# Patient Record
Sex: FEMALE | Race: WHITE | NOT HISPANIC OR LATINO | Employment: UNEMPLOYED | RURAL
[De-identification: names, ages, dates, MRNs, and addresses within clinical notes are randomized per-mention and may not be internally consistent; named-entity substitution may affect disease eponyms.]

---

## 2018-06-28 ENCOUNTER — OFFICE VISIT (OUTPATIENT)
Dept: FAMILY MEDICINE CLINIC | Facility: CLINIC | Age: 28
End: 2018-06-28

## 2018-06-28 VITALS
WEIGHT: 107 LBS | OXYGEN SATURATION: 98 % | RESPIRATION RATE: 20 BRPM | SYSTOLIC BLOOD PRESSURE: 115 MMHG | DIASTOLIC BLOOD PRESSURE: 80 MMHG | HEIGHT: 56 IN | HEART RATE: 101 BPM | TEMPERATURE: 99.2 F | BODY MASS INDEX: 24.07 KG/M2
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
## 2018-06-28 PROCEDURE — 99213 OFFICE O/P EST LOW 20 MIN: CPT | Performed by: NURSE PRACTITIONER

## 2018-06-28 RX ORDER — NORGESTREL-ETHINYL ESTRADIOL 0.3-0.03MG
TABLET ORAL
COMMUNITY
Start: 2018-06-06 | End: 2019-09-09

## 2018-06-28 RX ORDER — METHYLPREDNISOLONE 4 MG/1
TABLET ORAL
Qty: 21 EACH | Refills: 0 | Status: SHIPPED | OUTPATIENT
Start: 2018-06-28 | End: 2019-09-09

## 2018-06-28 NOTE — PROGRESS NOTES
Subjective   Paola Rivera is a 27 y.o. female.     Here today with complaints of an insect bite on her right upper thigh.  First noted yesterday.  Is very itchy and has used no meds on the site.      Insect Bite   This is a new problem. The current episode started yesterday. The problem occurs constantly. The problem has been unchanged. Associated symptoms include a rash. Pertinent negatives include no abdominal pain, anorexia, arthralgias, change in bowel habit, chest pain, chills, congestion, coughing, diaphoresis, fatigue, fever, headaches, joint swelling, myalgias, nausea, neck pain, numbness, sore throat, swollen glands, urinary symptoms, vertigo, visual change, vomiting or weakness. Nothing aggravates the symptoms. She has tried nothing for the symptoms. The treatment provided no relief.        The following portions of the patient's history were reviewed and updated as appropriate: allergies, current medications, past family history, past medical history, past social history, past surgical history and problem list.    Review of Systems   Constitutional: Negative.  Negative for chills, diaphoresis, fatigue and fever.   HENT: Negative.  Negative for congestion and sore throat.    Respiratory: Negative.  Negative for cough.    Cardiovascular: Negative.  Negative for chest pain.   Gastrointestinal: Negative for abdominal pain, anorexia, change in bowel habit, nausea and vomiting.   Musculoskeletal: Negative.  Negative for arthralgias, joint swelling, myalgias and neck pain.   Skin: Positive for rash.   Neurological: Negative.  Negative for vertigo, weakness and numbness.   Psychiatric/Behavioral: Negative.        Objective   Physical Exam   Constitutional: She is oriented to person, place, and time. She appears well-developed and well-nourished. No distress.   HENT:   Head: Normocephalic.   Eyes: Pupils are equal, round, and reactive to light.   Neck: Normal range of motion. Neck supple. No thyromegaly  present.   Cardiovascular: Normal rate, regular rhythm and normal heart sounds.  Exam reveals no friction rub.    No murmur heard.  Pulmonary/Chest: Effort normal and breath sounds normal. No respiratory distress. She has no wheezes. She has no rales.   Abdominal: Soft.   Musculoskeletal: Normal range of motion.   Neurological: She is alert and oriented to person, place, and time.   Skin: Skin is warm and dry.   Has a central pinpoint red spot that may have been the actual bite.  Also has some red raised areas around the bite.  There are no pustules or vesicles.  There area no areas of drainage or weeping.  No indication at this time of infection.   Psychiatric: She has a normal mood and affect. Thought content normal.   Nursing note and vitals reviewed.        Assessment/Plan   [place] was seen today for annual exam.    Diagnoses and all orders for this visit:    Insect bite, initial encounter  -     triamcinolone (KENALOG) 0.1 % ointment; Apply  topically 3 (Three) Times a Day.  -     MethylPREDNISolone (MEDROL, REYNALDO,) 4 MG tablet; Take as directed on package instructions.

## 2019-09-09 ENCOUNTER — OFFICE VISIT (OUTPATIENT)
Dept: FAMILY MEDICINE CLINIC | Facility: CLINIC | Age: 29
End: 2019-09-09

## 2019-09-09 VITALS
DIASTOLIC BLOOD PRESSURE: 70 MMHG | RESPIRATION RATE: 20 BRPM | BODY MASS INDEX: 24.86 KG/M2 | SYSTOLIC BLOOD PRESSURE: 110 MMHG | TEMPERATURE: 98 F | HEIGHT: 56 IN | OXYGEN SATURATION: 100 % | WEIGHT: 110.5 LBS | HEART RATE: 108 BPM

## 2019-09-09 DIAGNOSIS — N92.6 MISSED PERIOD: ICD-10-CM

## 2019-09-09 DIAGNOSIS — R11.0 NAUSEA: ICD-10-CM

## 2019-09-09 DIAGNOSIS — Z32.01 POSITIVE PREGNANCY TEST: Primary | ICD-10-CM

## 2019-09-09 LAB
B-HCG UR QL: POSITIVE
BILIRUB BLD-MCNC: ABNORMAL MG/DL
CLARITY, POC: ABNORMAL
COLOR UR: ABNORMAL
GLUCOSE UR STRIP-MCNC: NEGATIVE MG/DL
INTERNAL NEGATIVE CONTROL: NEGATIVE
INTERNAL POSITIVE CONTROL: POSITIVE
KETONES UR QL: ABNORMAL
LEUKOCYTE EST, POC: ABNORMAL
Lab: ABNORMAL
NITRITE UR-MCNC: NEGATIVE MG/ML
PH UR: 6 [PH] (ref 5–8)
PROT UR STRIP-MCNC: ABNORMAL MG/DL
RBC # UR STRIP: NEGATIVE /UL
SP GR UR: 1.03 (ref 1–1.03)
UROBILINOGEN UR QL: ABNORMAL

## 2019-09-09 PROCEDURE — 99213 OFFICE O/P EST LOW 20 MIN: CPT | Performed by: NURSE PRACTITIONER

## 2019-09-09 PROCEDURE — 81025 URINE PREGNANCY TEST: CPT | Performed by: NURSE PRACTITIONER

## 2019-09-09 PROCEDURE — 87086 URINE CULTURE/COLONY COUNT: CPT | Performed by: NURSE PRACTITIONER

## 2019-09-09 RX ORDER — DIPHENHYDRAMINE HYDROCHLORIDE 25 MG/1
25 CAPSULE ORAL EVERY 8 HOURS PRN
Qty: 30 TABLET | Refills: 1 | Status: SHIPPED | OUTPATIENT
Start: 2019-09-09 | End: 2021-06-07

## 2019-09-09 RX ORDER — FOLIC ACID, .BETA.-CAROTENE, ASCORBIC ACID, CHOLECALCIFEROL, .ALPHA.-TOCOPHEROL ACETATE, DL-, THIAMINE MONONITRATE, RIBOFLAVIN, NIACINAMIDE, PYRIDOXINE HYDROCHLORIDE, CYANOCOBALAMIN, CALCIUM PANTOTHENATE, CALCIUM CARBONATE, FERROUS FUMARATE, AND ZINC OXIDE 1; 1000; 100; 400; 30; 3; 3; 15; 20; 12; 7; 200; 29; 20 MG/1; [IU]/1; MG/1; [IU]/1; [IU]/1; MG/1; MG/1; MG/1; MG/1; UG/1; MG/1; MG/1; MG/1; MG/1
1 TABLET, CHEWABLE ORAL DAILY
Qty: 30 TABLET | Refills: 8 | Status: SHIPPED | OUTPATIENT
Start: 2019-09-09 | End: 2021-06-07

## 2019-09-09 NOTE — PROGRESS NOTES
"Subjective   Paola Rivera is a 29 y.o. female.     [facility] Visit    PCP: CATHERINE Lopez    CC: \"throwing up, smell of different stuff\"    Stopped taking OCP in early August due to nausea/vomiting.  N/V has been intermittent since that time.  Has had mild low back pain and occasional mild abdominal cramping.  Denies dysuria or vaginal discharge or bleeding.       Vomiting    This is a new problem. The current episode started more than 1 month ago. The problem occurs intermittently. The problem has been waxing and waning. There has been no fever. Pertinent negatives include no abdominal pain, arthralgias, chest pain, chills, coughing, diarrhea, dizziness, fever, headaches, myalgias, sweats, URI or weight loss. She has tried nothing for the symptoms.        The following portions of the patient's history were reviewed and updated as appropriate: allergies, current medications, past medical history, past social history, past surgical history and problem list.    Review of Systems   Constitutional: Positive for fatigue. Negative for appetite change, chills, fever and unexpected weight loss.   Respiratory: Negative.  Negative for cough.    Cardiovascular: Negative.  Negative for chest pain.   Gastrointestinal: Positive for nausea and vomiting. Negative for abdominal pain and diarrhea.   Genitourinary: Positive for menstrual problem. Negative for breast discharge, breast lump, breast pain and difficulty urinating.   Musculoskeletal: Negative for arthralgias and myalgias.   Skin: Negative for rash.   Neurological: Negative for dizziness and headache.     /70 (BP Location: Right arm, Patient Position: Sitting, Cuff Size: Adult)   Pulse 108   Temp 98 °F (36.7 °C) (Oral)   Resp 20   Ht 142.2 cm (56\")   Wt 50.1 kg (110 lb 8 oz)   LMP  (LMP Unknown)   SpO2 100%   Breastfeeding? No   BMI 24.77 kg/m²     Objective   Physical Exam   Constitutional: She is oriented to person, place, and time. She appears " well-developed and well-nourished. No distress.   Cardiovascular: Normal rate and regular rhythm.   Pulmonary/Chest: Effort normal and breath sounds normal. She has no wheezes. She has no rales.   Abdominal: Soft. Bowel sounds are normal. There is no tenderness. There is no rebound and no guarding.   Genitourinary:   Genitourinary Comments: No flank pain     Neurological: She is alert and oriented to person, place, and time.   Nursing note and vitals reviewed.    Recent Results (from the past 24 hour(s))   POCT pregnancy, urine    Collection Time: 09/09/19  4:45 PM   Result Value Ref Range    HCG, Urine, QL Positive (A) Negative    Lot Number [id number]     Internal Positive Control Positive     Internal Negative Control Negative    POCT urinalysis dipstick, automated    Collection Time: 09/09/19  4:46 PM   Result Value Ref Range    Color Orange (A) Yellow, Straw, Dark Yellow, Cherie    Clarity, UA Cloudy (A) Clear    Specific Gravity  1.030 1.005 - 1.030    pH, Urine 6.0 5.0 - 8.0    Leukocytes Trace (A) Negative    Nitrite, UA Negative Negative    Protein, POC 30 mg/dL (A) Negative mg/dL    Glucose, UA Negative Negative, 1000 mg/dL (3+) mg/dL    Ketones, UA Trace (A) Negative    Urobilinogen, UA 1 E.U./dL  (A) Normal    Bilirubin Small (1+) (A) Negative    Blood, UA Negative Negative     No Images in the past 120 days found..      Assessment/Plan   Paola was seen today for vomiting.    Diagnoses and all orders for this visit:    Positive pregnancy test  -     Ambulatory Referral to Obstetrics / Gynecology  -     Prenatal Vit-Fe Fumarate-FA (PRENATAL 19) 29-1 MG chewable tablet; Chew 1 tablet Daily.  -     vitamin B-6 (PYRIDOXINE) 25 MG tablet; Take 1 tablet by mouth Every 8 (Eight) Hours As Needed (nausea).    Nausea  -     POCT pregnancy, urine  -     POCT urinalysis dipstick, automated  -     Urine Culture - Urine, Urine, Clean Catch    Missed period  -     POCT pregnancy, urine      Information on First  Trimester provided  Rx for Prenatal Vitamins provided  Rx for B6 to help with nausea  Frequent small meals encouraged  Drink 6-8 glasses of water per day  Referral to OB placed--wishes to go to [facility]  Urine culture pending--will call if + and treat accordingly

## 2019-09-09 NOTE — PATIENT INSTRUCTIONS
First Trimester of Pregnancy    The first trimester of pregnancy is from week 1 until the end of week 13 (months 1 through 3). During this time, your baby will begin to develop inside you. At 6-8 weeks, the eyes and face are formed, and the heartbeat can be seen on ultrasound. At the end of 12 weeks, all the baby's organs are formed. Prenatal care is all the medical care you receive before the birth of your baby. Make sure you get good prenatal care and follow all of your doctor's instructions.  Follow these instructions at home:  Medicines  · Take over-the-counter and prescription medicines only as told by your doctor. Some medicines are safe and some medicines are not safe during pregnancy.  · Take a prenatal vitamin that contains at least 600 micrograms (mcg) of folic acid.  · If you have trouble pooping (constipation), take medicine that will make your stool soft (stool softener) if your doctor approves.  Eating and drinking    · Eat regular, healthy meals.  · Your doctor will tell you the amount of weight gain that is right for you.  · Avoid raw meat and uncooked cheese.  · If you feel sick to your stomach (nauseous) or throw up (vomit):  ? Eat 4 or 5 small meals a day instead of 3 large meals.  ? Try eating a few soda crackers.  ? Drink liquids between meals instead of during meals.  · To prevent constipation:  ? Eat foods that are high in fiber, like fresh fruits and vegetables, whole grains, and beans.  ? Drink enough fluids to keep your pee (urine) clear or pale yellow.  Activity  · Exercise only as told by your doctor. Stop exercising if you have cramps or pain in your lower belly (abdomen) or low back.  · Do not exercise if it is too hot, too humid, or if you are in a place of great height (high altitude).  · Try to avoid standing for long periods of time. Move your legs often if you must  one place for a long time.  · Avoid heavy lifting.  · Wear low-heeled shoes. Sit and stand up  straight.  · You can have sex unless your doctor tells you not to.  Relieving pain and discomfort  · Wear a good support bra if your breasts are sore.  · Take warm water baths (sitz baths) to soothe pain or discomfort caused by hemorrhoids. Use hemorrhoid cream if your doctor says it is okay.  · Rest with your legs raised if you have leg cramps or low back pain.  · If you have puffy, bulging veins (varicose veins) in your legs:  ? Wear support hose or compression stockings as told by your doctor.  ? Raise (elevate) your feet for 15 minutes, 3-4 times a day.  ? Limit salt in your food.  Prenatal care  · Schedule your prenatal visits by the twelfth week of pregnancy.  · Write down your questions. Take them to your prenatal visits.  · Keep all your prenatal visits as told by your doctor. This is important.  Safety  · Wear your seat belt at all times when driving.  · Make a list of emergency phone numbers. The list should include numbers for family, friends, the hospital, and police and fire departments.  General instructions  · Ask your doctor for a referral to a local prenatal class. Begin classes no later than at the start of month 6 of your pregnancy.  · Ask for help if you need counseling or if you need help with nutrition. Your doctor can give you advice or tell you where to go for help.  · Do not use hot tubs, steam rooms, or saunas.  · Do not douche or use tampons or scented sanitary pads.  · Do not cross your legs for long periods of time.  · Avoid all herbs and alcohol. Avoid drugs that are not approved by your doctor.  · Do not use any tobacco products, including cigarettes, chewing tobacco, and electronic cigarettes. If you need help quitting, ask your doctor. You may get counseling or other support to help you quit.  · Avoid cat litter boxes and soil used by cats. These carry germs that can cause birth defects in the baby and can cause a loss of your baby (miscarriage) or stillbirth.  · Visit your dentist.  At home, brush your teeth with a soft toothbrush. Be gentle when you floss.  Contact a doctor if:  · You are dizzy.  · You have mild cramps or pressure in your lower belly.  · You have a nagging pain in your belly area.  · You continue to feel sick to your stomach, you throw up, or you have watery poop (diarrhea).  · You have a bad smelling fluid coming from your vagina.  · You have pain when you pee (urinate).  · You have increased puffiness (swelling) in your face, hands, legs, or ankles.  Get help right away if:  · You have a fever.  · You are leaking fluid from your vagina.  · You have spotting or bleeding from your vagina.  · You have very bad belly cramping or pain.  · You gain or lose weight rapidly.  · You throw up blood. It may look like coffee grounds.  · You are around people who have [place] measles, fifth disease, or chickenpox.  · You have a very bad headache.  · You have shortness of breath.  · You have any kind of trauma, such as from a fall or a car accident.  Summary  · The first trimester of pregnancy is from week 1 until the end of week 13 (months 1 through 3).  · To take care of yourself and your unborn baby, you will need to eat healthy meals, take medicines only if your doctor tells you to do so, and do activities that are safe for you and your baby.  · Keep all follow-up visits as told by your doctor. This is important as your doctor will have to ensure that your baby is healthy and growing well.  This information is not intended to replace advice given to you by your health care provider. Make sure you discuss any questions you have with your health care provider.  Document Released: 06/05/2009 Document Revised: 12/26/2017 Document Reviewed: 12/26/2017  [employer] Interactive Patient Education © 2019 [employer]

## 2019-09-11 LAB — BACTERIA SPEC AEROBE CULT: NO GROWTH

## 2021-06-04 ENCOUNTER — TELEPHONE (OUTPATIENT)
Dept: FAMILY MEDICINE CLINIC | Facility: CLINIC | Age: 31
End: 2021-06-04

## 2021-06-07 ENCOUNTER — OFFICE VISIT (OUTPATIENT)
Dept: FAMILY MEDICINE CLINIC | Facility: CLINIC | Age: 31
End: 2021-06-07

## 2021-06-07 VITALS
TEMPERATURE: 98.2 F | WEIGHT: 106 LBS | BODY MASS INDEX: 23.84 KG/M2 | DIASTOLIC BLOOD PRESSURE: 70 MMHG | HEART RATE: 85 BPM | OXYGEN SATURATION: 95 % | SYSTOLIC BLOOD PRESSURE: 112 MMHG | RESPIRATION RATE: 20 BRPM | HEIGHT: 56 IN

## 2021-06-07 DIAGNOSIS — E55.9 VITAMIN D DEFICIENCY: ICD-10-CM

## 2021-06-07 DIAGNOSIS — Z13.6 SCREENING FOR CARDIOVASCULAR CONDITION: ICD-10-CM

## 2021-06-07 DIAGNOSIS — Z13.29 SCREENING FOR THYROID DISORDER: ICD-10-CM

## 2021-06-07 DIAGNOSIS — Z76.89 ENCOUNTER TO ESTABLISH CARE: ICD-10-CM

## 2021-06-07 DIAGNOSIS — Z13.220 SCREENING, LIPID: ICD-10-CM

## 2021-06-07 DIAGNOSIS — Z11.59 ENCOUNTER FOR HEPATITIS C SCREENING TEST FOR LOW RISK PATIENT: ICD-10-CM

## 2021-06-07 DIAGNOSIS — Z00.00 ROUTINE GENERAL MEDICAL EXAMINATION AT A HEALTH CARE FACILITY: Primary | ICD-10-CM

## 2021-06-07 DIAGNOSIS — Z13.1 SCREENING FOR DIABETES MELLITUS: ICD-10-CM

## 2021-06-07 PROBLEM — F41.9 ANXIETY: Status: RESOLVED | Noted: 2020-12-15 | Resolved: 2021-06-07

## 2021-06-07 PROBLEM — F41.9 ANXIETY: Status: ACTIVE | Noted: 2020-12-15

## 2021-06-07 PROBLEM — Z32.01 POSITIVE PREGNANCY TEST: Status: RESOLVED | Noted: 2019-09-09 | Resolved: 2021-06-07

## 2021-06-07 PROCEDURE — 99395 PREV VISIT EST AGE 18-39: CPT | Performed by: NURSE PRACTITIONER

## 2021-06-07 RX ORDER — NORGESTREL-ETHINYL ESTRADIOL 0.3-0.03MG
1 TABLET ORAL DAILY
COMMUNITY

## 2021-06-07 NOTE — PROGRESS NOTES
Chief Complaint  Establish Care (New Pt)    Subjective    History of Present Illness {CC  Problem List  Visit  Diagnosis   Encounters  Notes  Medications  Labs  Result Review Imaging  Media :23}     Paola Rivera presents to [facility] for   Est care with no c/o or concerns today. Reports hx of anxiety/panic episodes and trouble swallong and went to ER a few times but once she had her gallbladder removed she states no longer having these issues. Reports having EGD Jan 2021 that showed GERD and small hiatal hernia - had gall bladder removed approx 3-16/2021. Is unable to take reflux medication d/t unable to swallow pills. Reports sees provider at [facility] for PAP and OCP refills       Objective     Physical Exam  Vitals and nursing note reviewed.   Constitutional:       General: She is not in acute distress.     Appearance: Normal appearance. She is normal weight.      Comments: SO present at todays exam   HENT:      Head: Normocephalic and atraumatic.      Right Ear: Ear canal and external ear normal. A middle ear effusion (clear) is present.      Left Ear: Ear canal and external ear normal. A middle ear effusion (clear) is present.      Nose: Nose normal. No congestion or rhinorrhea.      Mouth/Throat:      Mouth: Mucous membranes are moist.      Pharynx: Oropharynx is clear.   Eyes:      Extraocular Movements: Extraocular movements intact.      Conjunctiva/sclera: Conjunctivae normal.      Pupils: Pupils are equal, round, and reactive to light.   Neck:      Vascular: No carotid bruit.   Cardiovascular:      Rate and Rhythm: Normal rate and regular rhythm.      Pulses: Normal pulses.      Heart sounds: Normal heart sounds. No murmur heard.     Pulmonary:      Effort: Pulmonary effort is normal.      Breath sounds: Normal breath sounds. No wheezing or rhonchi.   Abdominal:      General: Abdomen is flat. Bowel sounds are normal. There is no distension.      Palpations: Abdomen  "is soft.      Tenderness: There is no abdominal tenderness. There is no right CVA tenderness or left CVA tenderness.   Musculoskeletal:         General: No swelling, tenderness, deformity or signs of injury. Normal range of motion.      Cervical back: Normal range of motion and neck supple. No muscular tenderness.      Right lower leg: No edema.      Left lower leg: No edema.   Lymphadenopathy:      Cervical: No cervical adenopathy.   Skin:     General: Skin is warm and dry.      Capillary Refill: Capillary refill takes less than 2 seconds.      Coloration: Skin is not pale.      Findings: No erythema.   Neurological:      General: No focal deficit present.      Mental Status: She is alert and oriented to person, place, and time.   Psychiatric:         Mood and Affect: Mood normal.         Behavior: Behavior normal.        Result Review  Data Reviewed:{ Labs  Result Review  Imaging  Med Tab  Media :23}          Vital Signs:   /70 (BP Location: Right arm, Patient Position: Sitting, Cuff Size: Adult)   Pulse 85   Temp 98.2 °F (36.8 °C) (Temporal)   Resp 20   Ht 142.2 cm (56\")   Wt 48.1 kg (106 lb)   SpO2 95%   BMI 23.76 kg/m²          Assessment and Plan {CC Problem List  Visit Diagnosis  ROS  Review (Popup)  Health Maintenance  Quality  BestPractice  Medications  SmartSets  SnapShot Encounters  Media :23}   Problem List Items Addressed This Visit     None      Visit Diagnoses     Routine general medical examination at a health care facility    -  Primary    Encounter to establish care        Vitamin D deficiency        Relevant Orders    Vitamin D 25 hydroxy    Screening for cardiovascular condition        Relevant Orders    CBC No Differential    Comprehensive metabolic panel    Screening, lipid        Relevant Orders    Lipid panel    Screening for diabetes mellitus        Relevant Orders    Hemoglobin A1c    Screening for thyroid disorder        Relevant Orders    T4, free    TSH    " Encounter for hepatitis C screening test for low risk patient        Relevant Orders    Hepatitis C antibody         Diagnosis Plan   1. Routine general medical examination at a health care facility     2. Encounter to establish care     3. Vitamin D deficiency  Vitamin D 25 hydroxy   4. Screening for cardiovascular condition  CBC No Differential    Comprehensive metabolic panel   5. Screening, lipid  Lipid panel   6. Screening for diabetes mellitus  Hemoglobin A1c   7. Screening for thyroid disorder  T4, free    TSH   8. Encounter for hepatitis C screening test for low risk patient  Hepatitis C antibody     - Will call with lab results  - RTC yearly or PRN    Follow Up {Instructions Charge Capture  Follow-up Communications :23}   Return if symptoms worsen or fail to improve.  Patient was given instructions and counseling regarding her condition or for health maintenance advice. Please see specific information pulled into the AVS if appropriate            .  This document has been electronically signed by CATHERINE Gorman on June 7, 2021 16:45 CDT

## 2021-06-07 NOTE — PATIENT INSTRUCTIONS
Preventive Care 21-39 Years Old, Female  Preventive care refers to lifestyle choices and visits with your health care provider that can promote health and wellness. This includes:  · A yearly physical exam. This is also called an annual wellness visit.  · Regular dental and eye exams.  · Immunizations.  · Screening for certain conditions.  · Healthy lifestyle choices, such as:  ? Eating a healthy diet.  ? Getting regular exercise.  ? Not using drugs or products that contain nicotine and tobacco.  ? Limiting alcohol use.  What can I expect for my preventive care visit?  Physical exam  Your health care provider may check your:  · Height and weight. These may be used to calculate your BMI (body mass index). BMI is a measurement that tells if you are at a healthy weight.  · Heart rate and blood pressure.  · Body temperature.  · Skin for abnormal spots.  Counseling  Your health care provider may ask you questions about your:  · Past medical problems.  · Family's medical history.  · Alcohol, tobacco, and drug use.  · Emotional well-being.  · Home life and relationship well-being.  · Sexual activity.  · Diet, exercise, and sleep habits.  · Work and work environment.  · Access to firearms.  · Method of birth control.  · Menstrual cycle.  · Pregnancy history.  What immunizations do I need?    Vaccines are usually given at various ages, according to a schedule. Your health care provider will recommend vaccines for you based on your age, medical history, and lifestyle or other factors, such as travel or where you work.  What tests do I need?    Blood tests  · Lipid and cholesterol levels. These may be checked every 5 years starting at age 20.  · Hepatitis C test.  · Hepatitis B test.  Screening  · Diabetes screening. This is done by checking your blood sugar (glucose) after you have not eaten for a while (fasting).  · STD (sexually transmitted disease) testing, if you are at risk.  · BRCA-related cancer screening. This may be  done if you have a family history of breast, ovarian, tubal, or peritoneal cancers.  · Pelvic exam and Pap test. This may be done every 3 years starting at age 21. Starting at age 30, this may be done every 5 years if you have a Pap test in combination with an HPV test.  Talk with your health care provider about your test results, treatment options, and if necessary, the need for more tests.  Follow these instructions at home:  Eating and drinking    · Eat a healthy diet that includes fresh fruits and vegetables, whole grains, lean protein, and low-fat dairy products.  · Take vitamin and mineral supplements as recommended by your health care provider.  · Do not drink alcohol if:  ? Your health care provider tells you not to drink.  ? You are pregnant, may be pregnant, or are planning to become pregnant.  · If you drink alcohol:  ? Limit how much you have to 0-1 drink a day.  ? Be aware of how much alcohol is in your drink. In the [place] one drink equals one 12 oz bottle of beer (355 mL), one 5 oz glass of wine (148 mL), or one 1½ oz glass of hard liquor (44 mL).  Lifestyle  · Take daily care of your teeth and gums. Brush your teeth every morning and night with fluoride toothpaste. Floss one time each day.  · Stay active. Exercise for at least 30 minutes 5 or more days each week.  · Do not use any products that contain nicotine or tobacco, such as cigarettes, e-cigarettes, and chewing tobacco. If you need help quitting, ask your health care provider.  · Do not use drugs.  · If you are sexually active, practice safe sex. Use a condom or other form of birth control (contraception) in order to prevent pregnancy and STIs (sexually transmitted infections). If you plan to become pregnant, see your health care provider for a pre-conception visit.  · Find healthy ways to cope with stress, such as:  ? Meditation, yoga, or listening to music.  ? Journaling.  ? Talking to a trusted person.  ? Spending time with friends and  family.  Safety  · Always wear your seat belt while driving or riding in a vehicle.  · Do not drive:  ? If you have been drinking alcohol. Do not ride with someone who has been drinking.  ? When you are tired or distracted.  ? While texting.  · Wear a helmet and other protective equipment during sports activities.  · If you have firearms in your house, make sure you follow all gun safety procedures.  · Seek help if you have been physically or sexually abused.  What's next?  · Go to your health care provider once a year for an annual wellness visit.  · Ask your health care provider how often you should have your eyes and teeth checked.  · Stay up to date on all vaccines.  This information is not intended to replace advice given to you by your health care provider. Make sure you discuss any questions you have with your health care provider.  Document Revised: 09/02/2020 Document Reviewed: 08/29/2019  [employer] Patient Education © 2021 [employer]

## 2021-06-09 ENCOUNTER — LAB (OUTPATIENT)
Dept: LAB | Facility: HOSPITAL | Age: 31
End: 2021-06-09

## 2021-06-09 DIAGNOSIS — E55.9 VITAMIN D DEFICIENCY: ICD-10-CM

## 2021-06-09 DIAGNOSIS — Z11.59 ENCOUNTER FOR HEPATITIS C SCREENING TEST FOR LOW RISK PATIENT: ICD-10-CM

## 2021-06-09 DIAGNOSIS — Z13.220 SCREENING, LIPID: ICD-10-CM

## 2021-06-09 DIAGNOSIS — Z13.6 SCREENING FOR CARDIOVASCULAR CONDITION: ICD-10-CM

## 2021-06-09 DIAGNOSIS — Z13.29 SCREENING FOR THYROID DISORDER: ICD-10-CM

## 2021-06-09 DIAGNOSIS — Z13.1 SCREENING FOR DIABETES MELLITUS: ICD-10-CM

## 2021-06-09 LAB
25(OH)D3 SERPL-MCNC: 18.3 NG/ML (ref 30–100)
ALBUMIN SERPL-MCNC: 4.1 G/DL (ref 3.5–5.2)
ALBUMIN/GLOB SERPL: 1.5 G/DL
ALP SERPL-CCNC: 110 U/L (ref 39–117)
ALT SERPL W P-5'-P-CCNC: 12 U/L (ref 1–33)
ANION GAP SERPL CALCULATED.3IONS-SCNC: 9.4 MMOL/L (ref 5–15)
AST SERPL-CCNC: 9 U/L (ref 1–32)
BILIRUB SERPL-MCNC: 0.2 MG/DL (ref 0–1.2)
BUN SERPL-MCNC: 8 MG/DL (ref 6–20)
BUN/CREAT SERPL: 10.7 (ref 7–25)
CALCIUM SPEC-SCNC: 8.8 MG/DL (ref 8.6–10.5)
CHLORIDE SERPL-SCNC: 103 MMOL/L (ref 98–107)
CHOLEST SERPL-MCNC: 179 MG/DL (ref 0–200)
CO2 SERPL-SCNC: 24.6 MMOL/L (ref 22–29)
CREAT SERPL-MCNC: 0.75 MG/DL (ref 0.57–1)
DEPRECATED RDW RBC AUTO: 37.3 FL (ref 37–54)
ERYTHROCYTE [DISTWIDTH] IN BLOOD BY AUTOMATED COUNT: 12.7 % (ref 12.3–15.4)
GFR SERPL CREATININE-BSD FRML MDRD: 92 ML/MIN/1.73
GLOBULIN UR ELPH-MCNC: 2.8 GM/DL
GLUCOSE SERPL-MCNC: 83 MG/DL (ref 65–99)
HBA1C MFR BLD: 4.94 % (ref 4.8–5.6)
HCT VFR BLD AUTO: 42 % (ref 34–46.6)
HCV AB SER DONR QL: NORMAL
HDLC SERPL-MCNC: 54 MG/DL (ref 40–60)
HGB BLD-MCNC: 14.7 G/DL (ref 12–15.9)
LDLC SERPL CALC-MCNC: 112 MG/DL (ref 0–100)
LDLC/HDLC SERPL: 2.07 {RATIO}
MCH RBC QN AUTO: 28.7 PG (ref 26.6–33)
MCHC RBC AUTO-ENTMCNC: 35 G/DL (ref 31.5–35.7)
MCV RBC AUTO: 81.9 FL (ref 79–97)
PLATELET # BLD AUTO: 188 10*3/MM3 (ref 140–450)
PMV BLD AUTO: 11.8 FL (ref 6–12)
POTASSIUM SERPL-SCNC: 4.4 MMOL/L (ref 3.5–5.2)
PROT SERPL-MCNC: 6.9 G/DL (ref 6–8.5)
RBC # BLD AUTO: 5.13 10*6/MM3 (ref 3.77–5.28)
SODIUM SERPL-SCNC: 137 MMOL/L (ref 136–145)
T4 FREE SERPL-MCNC: 1.24 NG/DL (ref 0.93–1.7)
TRIGL SERPL-MCNC: 66 MG/DL (ref 0–150)
TSH SERPL DL<=0.05 MIU/L-ACNC: 0.73 UIU/ML (ref 0.27–4.2)
VLDLC SERPL-MCNC: 13 MG/DL (ref 5–40)
WBC # BLD AUTO: 5.69 10*3/MM3 (ref 3.4–10.8)

## 2021-06-09 PROCEDURE — 82306 VITAMIN D 25 HYDROXY: CPT

## 2021-06-09 PROCEDURE — 80053 COMPREHEN METABOLIC PANEL: CPT

## 2021-06-09 PROCEDURE — 84439 ASSAY OF FREE THYROXINE: CPT

## 2021-06-09 PROCEDURE — 84443 ASSAY THYROID STIM HORMONE: CPT

## 2021-06-09 PROCEDURE — 80061 LIPID PANEL: CPT

## 2021-06-09 PROCEDURE — 86803 HEPATITIS C AB TEST: CPT

## 2021-06-09 PROCEDURE — 85027 COMPLETE CBC AUTOMATED: CPT

## 2021-06-09 PROCEDURE — 83036 HEMOGLOBIN GLYCOSYLATED A1C: CPT

## 2021-10-15 ENCOUNTER — OFFICE VISIT (OUTPATIENT)
Dept: FAMILY MEDICINE CLINIC | Facility: CLINIC | Age: 31
End: 2021-10-15

## 2021-10-15 VITALS
TEMPERATURE: 98.4 F | OXYGEN SATURATION: 99 % | HEIGHT: 56 IN | BODY MASS INDEX: 25.25 KG/M2 | DIASTOLIC BLOOD PRESSURE: 62 MMHG | WEIGHT: 112.25 LBS | HEART RATE: 86 BPM | SYSTOLIC BLOOD PRESSURE: 86 MMHG | RESPIRATION RATE: 20 BRPM

## 2021-10-15 DIAGNOSIS — S40.861A INSECT BITE OF RIGHT UPPER ARM, INITIAL ENCOUNTER: Primary | ICD-10-CM
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
## 2021-10-15 PROCEDURE — 99213 OFFICE O/P EST LOW 20 MIN: CPT | Performed by: NURSE PRACTITIONER

## 2021-10-15 RX ORDER — TRIAMCINOLONE ACETONIDE 1 MG/G
1 CREAM TOPICAL 3 TIMES DAILY
Qty: 80 G | Refills: 0 | Status: SHIPPED | OUTPATIENT
Start: 2021-10-15 | End: 2022-09-07

## 2021-10-15 RX ORDER — HYDROXYZINE PAMOATE 25 MG/1
25 CAPSULE ORAL 3 TIMES DAILY PRN
Qty: 21 CAPSULE | Refills: 0 | Status: SHIPPED | OUTPATIENT
Start: 2021-10-15 | End: 2022-09-07

## 2021-10-15 RX ORDER — PREDNISONE 50 MG/1
50 TABLET ORAL DAILY
Qty: 5 TABLET | Refills: 0 | Status: SHIPPED | OUTPATIENT
Start: 2021-10-15 | End: 2022-09-07

## 2021-10-15 NOTE — PATIENT INSTRUCTIONS
Insect Bite, Adult  An insect bite can make your skin red, itchy, and swollen. An insect bite is different from an insect sting, which happens when an insect injects poison (venom) into the skin.  Some insects can spread disease to people through a bite. However, most insect bites do not lead to disease and are not serious.  What are the causes?  Insects may bite for a variety of reasons, including:  · Hunger.  · To defend themselves.  Insects that bite include:  · Spiders.  · Mosquitoes.  · Ticks.  · Fleas.  · Ants.  · Flies.  · Kissing bugs.  · Chiggers.  What are the signs or symptoms?  Symptoms of this condition include:  · Itching or pain in the bite area.  · Redness and swelling in the bite area.  · An open wound (skin ulcer).  In many cases, symptoms last for 2-4 days.  In rare cases, a person may have a severe allergic reaction (anaphylactic reaction) to a bite. Symptoms of an anaphylactic reaction may include:  · Feeling warm in the face (flushed). This may include redness.  · Itchy, red, swollen areas of skin (hives).  · Swelling of the eyes, lips, face, mouth, tongue, or throat.  · Difficulty breathing, speaking, or swallowing.  · Noisy breathing (wheezing).  · Dizziness or light-headedness.  · Fainting.  · Pain or cramping in the abdomen.  · Vomiting.  · Diarrhea.  How is this diagnosed?  This condition is usually diagnosed based on symptoms and a physical exam.  How is this treated?  Treatment is usually not needed. Symptoms often go away on their own. When treatment is recommended, it may involve:  · Applying a cream or lotion to the bite area. This treatment helps with itching.  · Taking an antibiotic medicine. This treatment is needed if the bite area gets infected.  · Getting a tetanus shot, if you are not up to date on this vaccine.  · Applying ice to the affected area.  · Allergy medicines called antihistamines. This treatment may be needed if you develop itching or an allergic reaction to the  "insect bite.  · Giving yourself an epinephrine injection if you have an anaphylactic reaction to a bite. To give the injection, you will use what is commonly called an auto-injector \"pen\" (pre-filled automatic epinephrine injection device). Your health care provider will teach you how to use an auto-injector pen.  Follow these instructions at home:  Bite area care    · Do not scratch the bite area.  · Keep the bite area clean and dry. Wash it every day with soap and water as told by your health care provider.  · Check the bite area every day for signs of infection. Check for:  ? Redness, swelling, or pain.  ? Fluid or blood.  ? Warmth.  ? Pus or a bad smell.    Managing pain, itching, and swelling    · You may apply cortisone cream, calamine lotion, or a paste made of baking soda and water to the bite area as told by your health care provider.  · If directed, put ice on the bite area.  ? Put ice in a plastic bag.  ? Place a towel between your skin and the bag.  ? Leave the ice on for 20 minutes, 2-3 times a day.    General instructions  · Apply or take over-the-counter and prescription medicines only as told by your health care provider.  · If you were prescribed an antibiotic medicine, take or apply it as told by your health care provider. Do not stop using the antibiotic even if your condition improves.  · Keep all follow-up visits as told by your health care provider. This is important.  How is this prevented?  To help reduce your risk of insect bites:  · When you are outdoors, wear clothing that covers your arms and legs. This is especially important in the early morning and evening.  · Use insect repellent. The best insect repellents contain DEET, picaridin, oil of lemon eucalyptus (OLE), or NT1260.  · Consider spraying your clothing with a pesticide called permethrin. Permethrin helps prevent insect bites. It works for several weeks and for up to 5-6 clothing washes. Do not apply permethrin directly to the " skin.  · If your home windows do not have screens, consider installing them.  · If you will be sleeping in an area where there are mosquitoes, consider covering your sleeping area with a mosquito net.  Contact a health care provider if:  · You have redness, swelling, or pain in the bite area.  · You have fluid or blood coming from the bite area.  · The bite area feels warm to the touch.  · You have pus or a bad smell coming from the bite area.  · You have a fever.  Get help right away if:  · You have joint pain.  · You have a rash.  · You feel unusually tired or sleepy.  · You have neck pain.  · You have a headache.  · You have unusual weakness.  · You develop symptoms of an anaphylactic reaction. These may include:  ? Flushed skin.  ? Hives.  ? Swelling of the eyes, lips, face, mouth, tongue, or throat.  ? Difficulty breathing, speaking, or swallowing.  ? Wheezing.  ? Dizziness or light-headedness.  ? Fainting.  ? Pain or cramping in the abdomen.  ? Vomiting.  ? Diarrhea.  These symptoms may represent a serious problem that is an emergency. Do not wait to see if the symptoms will go away. Do the following right away:  · Use the auto-injector pen as you have been instructed.  · Get medical help. Call your local emergency services (911 in the [place] Do not drive yourself to the hospital.  Summary  · An insect bite can make your skin red, itchy, and swollen.  · Treatment is usually not needed. Symptoms often go away on their own. When treatment is recommended, it may involve taking medicine, applying medicine to the area, or applying ice.  · Apply or take over-the-counter and prescription medicines only as told by your health care provider.  · Use insect repellent to help prevent insect bites.  · Contact a health care provider if you have any signs of infection in the bite area.  This information is not intended to replace advice given to you by your health care provider. Make sure you discuss any questions you have  with your health care provider.  Document Revised: 06/28/2019 Document Reviewed: 06/28/2019  Elsevier Patient Education © 2021 [employer]

## 2021-10-15 NOTE — PROGRESS NOTES
"Chief Complaint  possible insect bite/bites (x 2 days)    Subjective          Paola Rivera presents to [facility] - [facility]    PCP: CATHERINE Baker    CC: \"possible insect bite\"    Insect Bite  This is a new problem. The current episode started in the past 7 days (first noticed on 10-8). The problem occurs daily. Progression since onset: a few new spots have devloped, some have dried up. Associated symptoms include a rash. Pertinent negatives include no abdominal pain, anorexia, arthralgias, change in bowel habit, chest pain, chills, congestion, coughing, diaphoresis, fatigue, fever, headaches, joint swelling, myalgias, nausea, neck pain, numbness, sore throat, swollen glands, urinary symptoms, vertigo, visual change, vomiting or weakness. Associated symptoms comments: +redness and itching  . Nothing aggravates the symptoms. Treatments tried: anti itch cream. The treatment provided mild relief.       Review of Systems   Constitutional: Negative.  Negative for chills, diaphoresis, fatigue and fever.   HENT: Negative.  Negative for congestion and sore throat.    Eyes: Negative.    Respiratory: Negative.  Negative for cough.    Cardiovascular: Negative.  Negative for chest pain.   Gastrointestinal: Negative.  Negative for abdominal pain, anorexia, change in bowel habit, nausea and vomiting.   Genitourinary: Negative.    Musculoskeletal: Negative.  Negative for arthralgias, joint swelling, myalgias and neck pain.   Skin: Positive for rash.   Neurological: Negative for dizziness, vertigo, weakness, numbness and headaches.        Objective   Vital Signs:   BP (!) 86/62 (BP Location: Left arm, Patient Position: Sitting, Cuff Size: Adult)   Pulse 86   Temp 98.4 °F (36.9 °C) (Temporal)   Resp 20   Ht 142.2 cm (56\")   Wt 50.9 kg (112 lb 4 oz)   SpO2 99%   BMI 25.17 kg/m²       Physical Exam  Vitals and nursing note reviewed. "   Constitutional:       General: She is not in acute distress.     Appearance: Normal appearance. She is not ill-appearing.   HENT:      Head: Normocephalic and atraumatic.   Cardiovascular:      Rate and Rhythm: Normal rate and regular rhythm.   Pulmonary:      Effort: Pulmonary effort is normal. No respiratory distress.      Breath sounds: Normal breath sounds. No wheezing, rhonchi or rales.   Musculoskeletal:      Cervical back: Neck supple. No tenderness.   Lymphadenopathy:      Cervical: No cervical adenopathy.   Skin:     General: Skin is warm and dry.      Findings: Erythema ( mild) present.          Neurological:      General: No focal deficit present.      Mental Status: She is alert and oriented to person, place, and time.   Psychiatric:         Mood and Affect: Mood normal.         Thought Content: Thought content normal.          Result Review :                 Assessment and Plan    Diagnoses and all orders for this visit:    1. Insect bite of right upper arm, initial encounter (Primary)  -     predniSONE (DELTASONE) 50 MG tablet; Take 1 tablet by mouth Daily.  Dispense: 5 tablet; Refill: 0  -     triamcinolone (KENALOG) 0.1 % cream; Apply 1 application topically to the appropriate area as directed 3 (Three) Times a Day.  Dispense: 80 g; Refill: 0  -     hydrOXYzine pamoate (VISTARIL) 25 MG capsule; Take 1 capsule by mouth 3 (Three) Times a Day As Needed for Itching.  Dispense: 21 capsule; Refill: 0      Rx for Prednisone, Kenalog cream, Hydroxyzine provided  If recurrent bites, recommend having  check home for insects/bed bugs  S/S of secondary infection discussed and when f/u might be needed    See PCP or RTC if symptoms persist/worsen  See PCP for routine f/u visit and management of chronic medical conditions      This document has been electronically signed by CATHERINE Bailey on October 15, 2021 15:39 CDT,.

## 2022-09-07 ENCOUNTER — OFFICE VISIT (OUTPATIENT)
Dept: FAMILY MEDICINE CLINIC | Facility: CLINIC | Age: 32
End: 2022-09-07

## 2022-09-07 VITALS
RESPIRATION RATE: 20 BRPM | HEIGHT: 56 IN | HEART RATE: 85 BPM | SYSTOLIC BLOOD PRESSURE: 100 MMHG | TEMPERATURE: 97.2 F | DIASTOLIC BLOOD PRESSURE: 70 MMHG | OXYGEN SATURATION: 98 % | BODY MASS INDEX: 26.54 KG/M2 | WEIGHT: 118 LBS

## 2022-09-07 DIAGNOSIS — R21 RASH AND NONSPECIFIC SKIN ERUPTION: Primary | ICD-10-CM

## 2022-09-07 PROCEDURE — 99213 OFFICE O/P EST LOW 20 MIN: CPT | Performed by: NURSE PRACTITIONER

## 2022-09-07 NOTE — PROGRESS NOTES
"Chief Complaint  Rash    Subjective    History of Present Illness {CC  Problem List  Visit  Diagnosis   Encounters  Notes  Medications  Labs  Result Review Imaging  Media :23}     Paola Rivera presents to [facility] - [place] for   F/u on rash - [facility] for single large erythematous painful lesion on left lateral knee area - was concerned that she was bitten by something - reports lesion was swabbed by  but never heard about result. Was started on \"large white pill\" and topical ointment but was unsure what it was - states she was only able to take 2 of the pills as it made her have palpitations and caused her anxiety to worsen - sx resolved after stopping abx. Reports lesion on knee has almost completely resolved - also reports additional rash that was on lower abdomen, bilat sides and lower back - states she figured out it was poison ivy that she came in contact with - this rash has completely resolved. Voices no other c/o or concerns today - states she came in because  told her to f/u with her PCP.     Rash  This is a new problem. The current episode started 1 to 4 weeks ago. The problem has been rapidly improving since onset. The affected locations include the abdomen, back and left lower leg. The rash is characterized by itchiness and burning. She was exposed to plant contact. Pertinent negatives include no cough, fatigue, fever, joint pain, rhinorrhea, shortness of breath or sore throat. Past treatments include topical steroids and antibiotics. The treatment provided significant relief.        Objective     Physical Exam  Vitals and nursing note reviewed.   Constitutional:       General: She is not in acute distress.     Appearance: Normal appearance. She is normal weight. She is not ill-appearing.   HENT:      Head: Normocephalic and atraumatic.   Eyes:      Conjunctiva/sclera: Conjunctivae normal.      Pupils: Pupils " "are equal, round, and reactive to light.   Cardiovascular:      Rate and Rhythm: Normal rate and regular rhythm.      Heart sounds: Normal heart sounds.   Pulmonary:      Effort: Pulmonary effort is normal.      Breath sounds: Normal breath sounds.   Musculoskeletal:         General: Normal range of motion.      Cervical back: Normal range of motion.   Skin:     General: Skin is warm and dry.          Neurological:      General: No focal deficit present.      Mental Status: She is alert and oriented to person, place, and time.   Psychiatric:         Mood and Affect: Mood normal.         Behavior: Behavior normal.        Result Review  Data Reviewed:{ Labs  Result Review  Imaging  Med Tab  Media :23}   The following data was reviewed by (Optional):[id number]}      No Images in the past 120 days found..       Vital Signs:   /70 (BP Location: Left arm, Patient Position: Sitting, Cuff Size: Adult)   Pulse 85   Temp 97.2 °F (36.2 °C) (Temporal)   Resp 20   Ht 142.2 cm (56\")   Wt 53.5 kg (118 lb)   SpO2 98%   BMI 26.46 kg/m²          Assessment and Plan {CC Problem List  Visit Diagnosis  ROS  Review (Popup)  Health Maintenance  Quality  BestPractice  Medications  SmartSets  SnapShot Encounters  Media :23}   Problem List Items Addressed This Visit    None     Visit Diagnoses     Rash and nonspecific skin eruption    -  Primary    Relevant Medications    triamcinolone (KENALOG) 0.1 % ointment    mupirocin (BACTROBAN) 2 % ointment         Diagnosis Plan   1. Rash and nonspecific skin eruption  triamcinolone (KENALOG) 0.1 % ointment    mupirocin (BACTROBAN) 2 % ointment     - Rash on upper torso has completely resolved - only small lesion remaining on left lateral let/knee that is almost healed. No erythema or drainage seen. RX for triamcinolone and mupirocin submitted - discussed if rash returns to use triamcinolone for itching but if rash appears open and draining to mix triamcinolone with mupirocin " and apply both.  - RTC PRN    Follow Up {Instructions Charge Capture  Follow-up Communications :23}   Return if symptoms worsen or fail to improve.  Patient was given instructions and counseling regarding her condition or for health maintenance advice. Please see specific information pulled into the AVS if appropriate            .  This document has been electronically signed by CATHERINE Gorman on September 7, 2022 22:05 CDT

## 2022-09-26 ENCOUNTER — OFFICE VISIT (OUTPATIENT)
Dept: FAMILY MEDICINE CLINIC | Facility: CLINIC | Age: 32
End: 2022-09-26

## 2022-09-26 VITALS
HEART RATE: 71 BPM | SYSTOLIC BLOOD PRESSURE: 102 MMHG | HEIGHT: 56 IN | WEIGHT: 117.5 LBS | BODY MASS INDEX: 26.43 KG/M2 | DIASTOLIC BLOOD PRESSURE: 68 MMHG | OXYGEN SATURATION: 98 % | RESPIRATION RATE: 22 BRPM

## 2022-09-26 DIAGNOSIS — E55.9 VITAMIN D DEFICIENCY: ICD-10-CM

## 2022-09-26 DIAGNOSIS — Z13.220 SCREENING, LIPID: ICD-10-CM

## 2022-09-26 DIAGNOSIS — Z13.1 SCREENING FOR DIABETES MELLITUS: ICD-10-CM

## 2022-09-26 DIAGNOSIS — Z76.89 ENCOUNTER TO ESTABLISH CARE: Primary | ICD-10-CM

## 2022-09-26 DIAGNOSIS — Z13.29 SCREENING FOR THYROID DISORDER: ICD-10-CM

## 2022-09-26 PROCEDURE — 99213 OFFICE O/P EST LOW 20 MIN: CPT | Performed by: NURSE PRACTITIONER

## 2022-09-26 NOTE — PROGRESS NOTES
"Chief Complaint  Establish Care    Subjective          Paola Rivera presents to [facility] - [place] to establish care.  She is not having any issues today is a to establish care.  History of Present Illness  HPI: Establish care     Outpatient Medications Prior to Visit   Medication Sig Dispense Refill   • mupirocin (BACTROBAN) 2 % ointment Apply 1 application topically to the appropriate area as directed 3 (Three) Times a Day. 90 g 0   • norgestrel-ethinyl estradiol (Cryselle-28) 0.3-30 MG-MCG per tablet Take 1 tablet by mouth Daily.     • triamcinolone (KENALOG) 0.1 % ointment Apply 1 application topically to the appropriate area as directed 2 (Two) Times a Day. 80 g 0     No facility-administered medications prior to visit.       Review of Systems      Objective   Vital Signs:   Visit Vitals  /68 (BP Location: Left arm, Patient Position: Sitting, Cuff Size: Adult)   Pulse 71   Resp 22   Ht 142.2 cm (56\")   Wt 53.3 kg (117 lb 8 oz)   SpO2 98%   BMI 26.34 kg/m²     Physical Exam  Vitals and nursing note reviewed.   Constitutional:       Appearance: She is well-developed.   HENT:      Head: Normocephalic and atraumatic.   Eyes:      General: Lids are normal.      Conjunctiva/sclera: Conjunctivae normal.   Neck:      Thyroid: No thyroid mass or thyromegaly.      Trachea: Trachea normal. No tracheal tenderness.   Cardiovascular:      Rate and Rhythm: Normal rate.      Pulses: Normal pulses.      Heart sounds: Normal heart sounds.   Pulmonary:      Effort: Pulmonary effort is normal. No respiratory distress.      Breath sounds: Normal breath sounds. No wheezing.   Abdominal:      General: There is no distension.      Palpations: Abdomen is soft. There is no mass.   Musculoskeletal:         General: Normal range of motion.      Cervical back: Normal range of motion. No edema.   Skin:     General: Skin is warm and dry.      Coloration: Skin is not pale.      " Findings: No abrasion, erythema or lesion.   Neurological:      Mental Status: She is alert and oriented to person, place, and time.   Psychiatric:         Mood and Affect: Mood is not anxious. Affect is not inappropriate.         Speech: Speech normal.         Behavior: Behavior normal.         Thought Content: Thought content normal.         Judgment: Judgment normal. Judgment is not impulsive.        Result Review :                 Assessment and Plan    Diagnoses and all orders for this visit:    1. Encounter to establish care (Primary)  -     TSH; Future  -     Vitamin D 25 Hydroxy; Future  -     Comprehensive Metabolic Panel; Future  -     Hemoglobin A1c; Future  -     CBC & Differential; Future  -     Lipid Panel; Future  -     Vitamin B12; Future    2. Vitamin D deficiency  -     TSH; Future  -     Vitamin D 25 Hydroxy; Future  -     Comprehensive Metabolic Panel; Future  -     Hemoglobin A1c; Future  -     CBC & Differential; Future  -     Lipid Panel; Future  -     Vitamin B12; Future    3. Screening, lipid  -     TSH; Future  -     Vitamin D 25 Hydroxy; Future  -     Comprehensive Metabolic Panel; Future  -     Hemoglobin A1c; Future  -     CBC & Differential; Future  -     Lipid Panel; Future  -     Vitamin B12; Future    4. Screening for diabetes mellitus  -     TSH; Future  -     Vitamin D 25 Hydroxy; Future  -     Comprehensive Metabolic Panel; Future  -     Hemoglobin A1c; Future  -     CBC & Differential; Future  -     Lipid Panel; Future  -     Vitamin B12; Future    5. Screening for thyroid disorder  -     TSH; Future  -     Vitamin D 25 Hydroxy; Future  -     Comprehensive Metabolic Panel; Future  -     Hemoglobin A1c; Future  -     CBC & Differential; Future  -     Lipid Panel; Future  -     Vitamin B12; Future      Complete ordered lab work  We will call with results    Continue current medications    Please call the office if you have any issues    Follow Up   No follow-ups on file.  Patient  was given instructions and counseling regarding her condition or for health maintenance advice. Please see specific information pulled into the AVS if appropriate.           This document has been electronically signed by CATHREINE Murillo on September 27, 2022 17:10 CDT

## 2022-09-28 ENCOUNTER — LAB (OUTPATIENT)
Dept: LAB | Facility: HOSPITAL | Age: 32
End: 2022-09-28

## 2022-09-28 DIAGNOSIS — Z13.29 SCREENING FOR THYROID DISORDER: ICD-10-CM

## 2022-09-28 DIAGNOSIS — E55.9 VITAMIN D DEFICIENCY: ICD-10-CM

## 2022-09-28 DIAGNOSIS — Z76.89 ENCOUNTER TO ESTABLISH CARE: ICD-10-CM

## 2022-09-28 DIAGNOSIS — Z13.220 SCREENING, LIPID: ICD-10-CM

## 2022-09-28 DIAGNOSIS — Z13.1 SCREENING FOR DIABETES MELLITUS: ICD-10-CM

## 2022-09-28 LAB
25(OH)D3 SERPL-MCNC: 22.1 NG/ML (ref 30–100)
ALBUMIN SERPL-MCNC: 4.7 G/DL (ref 3.5–5.2)
ALBUMIN/GLOB SERPL: 1.7 G/DL
ALP SERPL-CCNC: 123 U/L (ref 39–117)
ALT SERPL W P-5'-P-CCNC: 16 U/L (ref 1–33)
ANION GAP SERPL CALCULATED.3IONS-SCNC: 10.2 MMOL/L (ref 5–15)
AST SERPL-CCNC: 16 U/L (ref 1–32)
BASOPHILS # BLD AUTO: 0.04 10*3/MM3 (ref 0–0.2)
BASOPHILS NFR BLD AUTO: 0.5 % (ref 0–1.5)
BILIRUB SERPL-MCNC: <0.2 MG/DL (ref 0–1.2)
BUN SERPL-MCNC: 8 MG/DL (ref 6–20)
BUN/CREAT SERPL: 9.9 (ref 7–25)
CALCIUM SPEC-SCNC: 9.4 MG/DL (ref 8.6–10.5)
CHLORIDE SERPL-SCNC: 102 MMOL/L (ref 98–107)
CHOLEST SERPL-MCNC: 198 MG/DL (ref 0–200)
CO2 SERPL-SCNC: 26.8 MMOL/L (ref 22–29)
CREAT SERPL-MCNC: 0.81 MG/DL (ref 0.57–1)
DEPRECATED RDW RBC AUTO: 40.7 FL (ref 37–54)
EGFRCR SERPLBLD CKD-EPI 2021: 100.3 ML/MIN/1.73
EOSINOPHIL # BLD AUTO: 0.06 10*3/MM3 (ref 0–0.4)
EOSINOPHIL NFR BLD AUTO: 0.7 % (ref 0.3–6.2)
ERYTHROCYTE [DISTWIDTH] IN BLOOD BY AUTOMATED COUNT: 13 % (ref 12.3–15.4)
GLOBULIN UR ELPH-MCNC: 2.7 GM/DL
GLUCOSE SERPL-MCNC: 93 MG/DL (ref 65–99)
HBA1C MFR BLD: 5.2 % (ref 4.8–5.6)
HCT VFR BLD AUTO: 43.7 % (ref 34–46.6)
HDLC SERPL-MCNC: 61 MG/DL (ref 40–60)
HGB BLD-MCNC: 14.4 G/DL (ref 12–15.9)
IMM GRANULOCYTES # BLD AUTO: 0.02 10*3/MM3 (ref 0–0.05)
IMM GRANULOCYTES NFR BLD AUTO: 0.2 % (ref 0–0.5)
LDLC SERPL CALC-MCNC: 122 MG/DL (ref 0–100)
LDLC/HDLC SERPL: 1.98 {RATIO}
LYMPHOCYTES # BLD AUTO: 4.63 10*3/MM3 (ref 0.7–3.1)
LYMPHOCYTES NFR BLD AUTO: 53.6 % (ref 19.6–45.3)
MCH RBC QN AUTO: 28.6 PG (ref 26.6–33)
MCHC RBC AUTO-ENTMCNC: 33 G/DL (ref 31.5–35.7)
MCV RBC AUTO: 86.9 FL (ref 79–97)
MONOCYTES # BLD AUTO: 0.55 10*3/MM3 (ref 0.1–0.9)
MONOCYTES NFR BLD AUTO: 6.4 % (ref 5–12)
NEUTROPHILS NFR BLD AUTO: 3.34 10*3/MM3 (ref 1.7–7)
NEUTROPHILS NFR BLD AUTO: 38.6 % (ref 42.7–76)
NRBC BLD AUTO-RTO: 0 /100 WBC (ref 0–0.2)
PLATELET # BLD AUTO: 197 10*3/MM3 (ref 140–450)
PMV BLD AUTO: 11.9 FL (ref 6–12)
POTASSIUM SERPL-SCNC: 4.4 MMOL/L (ref 3.5–5.2)
PROT SERPL-MCNC: 7.4 G/DL (ref 6–8.5)
RBC # BLD AUTO: 5.03 10*6/MM3 (ref 3.77–5.28)
SODIUM SERPL-SCNC: 139 MMOL/L (ref 136–145)
TRIGL SERPL-MCNC: 81 MG/DL (ref 0–150)
TSH SERPL DL<=0.05 MIU/L-ACNC: 2.8 UIU/ML (ref 0.27–4.2)
VIT B12 BLD-MCNC: 209 PG/ML (ref 211–946)
VLDLC SERPL-MCNC: 15 MG/DL (ref 5–40)
WBC NRBC COR # BLD: 8.64 10*3/MM3 (ref 3.4–10.8)

## 2022-09-28 PROCEDURE — 36415 COLL VENOUS BLD VENIPUNCTURE: CPT

## 2022-09-28 PROCEDURE — 80053 COMPREHEN METABOLIC PANEL: CPT

## 2022-09-28 PROCEDURE — 83036 HEMOGLOBIN GLYCOSYLATED A1C: CPT

## 2022-09-28 PROCEDURE — 82607 VITAMIN B-12: CPT

## 2022-09-28 PROCEDURE — 80061 LIPID PANEL: CPT

## 2022-09-28 PROCEDURE — 85025 COMPLETE CBC W/AUTO DIFF WBC: CPT

## 2022-09-28 PROCEDURE — 84443 ASSAY THYROID STIM HORMONE: CPT

## 2022-09-28 PROCEDURE — 82306 VITAMIN D 25 HYDROXY: CPT

## 2022-09-30 ENCOUNTER — TELEPHONE (OUTPATIENT)
Dept: FAMILY MEDICINE CLINIC | Facility: CLINIC | Age: 32
End: 2022-09-30

## 2023-09-27 ENCOUNTER — OFFICE VISIT (OUTPATIENT)
Dept: FAMILY MEDICINE CLINIC | Facility: CLINIC | Age: 33
End: 2023-09-27
Payer: COMMERCIAL

## 2023-09-27 VITALS
OXYGEN SATURATION: 99 % | BODY MASS INDEX: 25.42 KG/M2 | HEART RATE: 95 BPM | WEIGHT: 113 LBS | DIASTOLIC BLOOD PRESSURE: 62 MMHG | HEIGHT: 56 IN | SYSTOLIC BLOOD PRESSURE: 104 MMHG

## 2023-09-27 DIAGNOSIS — Z00.00 ANNUAL PHYSICAL EXAM: Primary | ICD-10-CM

## 2023-09-27 DIAGNOSIS — E55.9 VITAMIN D DEFICIENCY: ICD-10-CM

## 2023-09-27 DIAGNOSIS — Z13.220 SCREENING, LIPID: ICD-10-CM

## 2023-09-27 DIAGNOSIS — F41.9 ANXIETY: ICD-10-CM

## 2023-09-27 DIAGNOSIS — Z13.29 SCREENING FOR THYROID DISORDER: ICD-10-CM

## 2023-09-27 PROCEDURE — 99395 PREV VISIT EST AGE 18-39: CPT | Performed by: NURSE PRACTITIONER

## 2023-09-27 PROCEDURE — 1159F MED LIST DOCD IN RCRD: CPT | Performed by: NURSE PRACTITIONER

## 2023-09-27 PROCEDURE — 1160F RVW MEDS BY RX/DR IN RCRD: CPT | Performed by: NURSE PRACTITIONER

## 2023-09-27 PROCEDURE — 2014F MENTAL STATUS ASSESS: CPT | Performed by: NURSE PRACTITIONER

## 2023-09-27 PROCEDURE — 3008F BODY MASS INDEX DOCD: CPT | Performed by: NURSE PRACTITIONER

## 2023-09-27 RX ORDER — BUSPIRONE HYDROCHLORIDE 5 MG/1
5 TABLET ORAL 3 TIMES DAILY
Qty: 90 TABLET | Refills: 3 | Status: SHIPPED | OUTPATIENT
Start: 2023-09-27

## 2023-09-27 NOTE — PROGRESS NOTES
"Chief Complaint  Annual Exam    Subjective          Paola Rivera presents to [facility] [place] for annual exam. She would like to discuss her heart health with family history. She also has been having issues with anxiety at times.            History of Present Illness  Annual exam  Heartburn  She complains of chest pain and heartburn. This is a chronic problem. The current episode started more than 1 year ago. The problem occurs occasionally. The problem has been gradually improving. The symptoms are aggravated by certain foods. She has tried an antacid for the symptoms. The treatment provided moderate relief.   Anxiety  Presents for initial visit. Onset was at an unknown time. The problem has been waxing and waning. Symptoms include chest pain, excessive worry and nervous/anxious behavior. Symptoms occur occasionally. The severity of symptoms is moderate. The symptoms are aggravated by family issues.       Outpatient Medications Prior to Visit   Medication Sig Dispense Refill    norgestrel-ethinyl estradiol (Cryselle-28) 0.3-30 MG-MCG per tablet Take 1 tablet by mouth Daily.      mupirocin (BACTROBAN) 2 % ointment Apply 1 application topically to the appropriate area as directed 3 (Three) Times a Day. (Patient not taking: Reported on 9/27/2023) 90 g 0    triamcinolone (KENALOG) 0.1 % ointment Apply 1 application topically to the appropriate area as directed 2 (Two) Times a Day. (Patient not taking: Reported on 9/27/2023) 80 g 0     No facility-administered medications prior to visit.       Review of Systems   Cardiovascular:  Positive for chest pain.   Gastrointestinal:  Positive for heartburn.   Psychiatric/Behavioral:  The patient is nervous/anxious.        Objective   Vital Signs:   Visit Vitals  /62 (BP Location: Left arm, Patient Position: Sitting, Cuff Size: Adult)   Pulse 95   Ht 142.2 cm (55.98\")   Wt 51.3 kg (113 lb)   LMP  (LMP Unknown)   SpO2 99% "   BMI 25.35 kg/m²     Physical Exam  Vitals and nursing note reviewed.   Constitutional:       Appearance: She is well-developed.   HENT:      Head: Normocephalic and atraumatic.   Eyes:      General: Lids are normal.      Conjunctiva/sclera: Conjunctivae normal.   Neck:      Thyroid: No thyroid mass or thyromegaly.      Trachea: Trachea normal. No tracheal tenderness.   Cardiovascular:      Rate and Rhythm: Normal rate.      Pulses: Normal pulses.      Heart sounds: Normal heart sounds.   Pulmonary:      Effort: Pulmonary effort is normal. No respiratory distress.      Breath sounds: Normal breath sounds. No wheezing.   Abdominal:      General: There is no distension.      Palpations: Abdomen is soft. There is no mass.   Musculoskeletal:         General: Normal range of motion.      Cervical back: Normal range of motion. No edema.   Skin:     General: Skin is warm and dry.      Coloration: Skin is not pale.      Findings: No abrasion, erythema or lesion.   Neurological:      Mental Status: She is alert and oriented to person, place, and time.   Psychiatric:         Mood and Affect: Mood is not anxious. Affect is not inappropriate.         Speech: Speech normal.         Behavior: Behavior normal.         Thought Content: Thought content normal.         Judgment: Judgment normal. Judgment is not impulsive.      Result Review :                 Assessment and Plan    Diagnoses and all orders for this visit:    1. Annual physical exam (Primary)  -     TSH; Future  -     Vitamin D,25-Hydroxy; Future  -     Comprehensive Metabolic Panel; Future  -     Hemoglobin A1c; Future  -     CBC & Differential; Future  -     Vitamin B12; Future  -     Lipid Panel; Future    2. Vitamin D deficiency  -     TSH; Future  -     Vitamin D,25-Hydroxy; Future  -     Comprehensive Metabolic Panel; Future  -     Hemoglobin A1c; Future  -     CBC & Differential; Future  -     Vitamin B12; Future  -     Lipid Panel; Future    3. Screening,  lipid  -     TSH; Future  -     Vitamin D,25-Hydroxy; Future  -     Comprehensive Metabolic Panel; Future  -     Hemoglobin A1c; Future  -     CBC & Differential; Future  -     Vitamin B12; Future  -     Lipid Panel; Future    4. Screening for thyroid disorder  -     TSH; Future  -     Vitamin D,25-Hydroxy; Future  -     Comprehensive Metabolic Panel; Future  -     Hemoglobin A1c; Future  -     CBC & Differential; Future  -     Vitamin B12; Future  -     Lipid Panel; Future    5. Anxiety  -     busPIRone (BUSPAR) 5 MG tablet; Take 1 tablet by mouth 3 (Three) Times a Day. You may crush tablet.  Dispense: 90 tablet; Refill: 3        Complete ordered lab work   We will call with results  Pending lab results we will discuss further treatment plan and monitoring     We will start Buspar as needed for anxiety  If you have any thoughts of harming yourself or others, please stop this medication and go to ER  We will reevaluate in one month   Please call if you have questions, concerns or issues with this medication       You may crush the Buspar if needed             She exercises regularly: yes.  Healthy Diet:yes.  She wears her seat belt:yes.  She has concerns about domestic violence: no.    She is sexually active.    In the past 12 months there has not been new sexual partners.      Periods Regular: yes.      She is taking Vit D and Calcium:no  Last PAP: 3/21      Immunization status: up to date and documented.        The following portions of the patient's history were reviewed and updated as appropriate:problem list, current medications, allergies, past family history, past medical history, past social history, and past surgical history.        Follow Up   Return in about 4 weeks (around 10/25/2023), or if symptoms worsen or fail to improve, for Next scheduled follow up, Recheck.  Patient was given instructions and counseling regarding her condition or for health maintenance advice. Please see specific information  pulled into the AVS if appropriate.           This document has been electronically signed by CATHERINE Murillo on September 27, 2023 14:00 CDT